# Patient Record
Sex: MALE
[De-identification: names, ages, dates, MRNs, and addresses within clinical notes are randomized per-mention and may not be internally consistent; named-entity substitution may affect disease eponyms.]

---

## 2023-12-15 ENCOUNTER — P2P PATIENT RECORD (OUTPATIENT)
Age: 78
End: 2023-12-15

## 2024-01-08 ENCOUNTER — OFFICE VISIT (OUTPATIENT)
Dept: URBAN - METROPOLITAN AREA CLINIC 19 | Facility: CLINIC | Age: 79
End: 2024-01-08
Payer: MEDICARE

## 2024-01-08 DIAGNOSIS — R63.4 WEIGHT LOSS: ICD-10-CM

## 2024-01-08 DIAGNOSIS — D50.8 OTHER IRON DEFICIENCY ANEMIA: ICD-10-CM

## 2024-01-08 DIAGNOSIS — R63.0 LOSS OF APPETITE: ICD-10-CM

## 2024-01-08 PROCEDURE — 99204 OFFICE O/P NEW MOD 45 MIN: CPT | Performed by: INTERNAL MEDICINE

## 2024-01-08 RX ORDER — APIXABAN 5 MG/1
1 TABLET TABLET, FILM COATED ORAL TWICE A DAY
Status: ACTIVE | COMMUNITY

## 2024-01-08 RX ORDER — TRAZODONE HYDROCHLORIDE 150 MG/1
1 TABLET AT BEDTIME TABLET ORAL ONCE A DAY
Status: ACTIVE | COMMUNITY

## 2024-01-08 RX ORDER — ROSUVASTATIN CALCIUM 20 MG/1
1 TABLET TABLET, COATED ORAL ONCE A DAY
Status: ACTIVE | COMMUNITY

## 2024-01-08 RX ORDER — DOXEPIN HYDROCHLORIDE 10 MG/1
1 CAPSULE AT BEDTIME CAPSULE ORAL ONCE A DAY
Status: ACTIVE | COMMUNITY

## 2024-01-08 RX ORDER — DUTASTERIDE 0.5 MG/1
1 CAPSULE CAPSULE, LIQUID FILLED ORAL ONCE A DAY
Status: ACTIVE | COMMUNITY

## 2024-01-08 RX ORDER — LORAZEPAM 1 MG/1
1 TABLET AT BEDTIME AS NEEDED TABLET ORAL ONCE A DAY
Status: ACTIVE | COMMUNITY

## 2024-01-08 RX ORDER — CYCLOBENZAPRINE HYDROCHLORIDE 10 MG/1
1 TABLET AT BEDTIME AS NEEDED TABLET, FILM COATED ORAL ONCE A DAY
Status: ACTIVE | COMMUNITY

## 2024-01-08 RX ORDER — DULOXETINE 60 MG/1
1 CAPSULE CAPSULE, DELAYED RELEASE PELLETS ORAL ONCE A DAY
Status: ACTIVE | COMMUNITY

## 2024-01-08 RX ORDER — HYDROCODONE BITARTRATE AND ACETAMINOPHEN 10; 325 MG/1; MG/1
1 TABLET AS NEEDED TABLET ORAL
Status: ACTIVE | COMMUNITY

## 2024-01-08 NOTE — HPI-TODAY'S VISIT:
Mr. Everett is a 78 yr old man referred by Dr. Andrés Chaudhari for anemia and weight loss. His mst recent Hb was 11 , iron sat 13%, ferritin 38. Also has lost almost 40 pounds in the past few weeks. Very poor appetite. On Eliquis for atrial fibrillation. Denies any blood in stool.

## 2024-01-08 NOTE — PHYSICAL EXAM CONSTITUTIONAL:
Thin, chronically ill appearing elderly man , in no acute distress , ambulating without difficulty , normal communication ability

## 2024-01-14 ENCOUNTER — DASHBOARD ENCOUNTERS (OUTPATIENT)
Age: 79
End: 2024-01-14

## 2024-01-14 ENCOUNTER — LAB OUTSIDE AN ENCOUNTER (OUTPATIENT)
Dept: URBAN - METROPOLITAN AREA CLINIC 19 | Facility: CLINIC | Age: 79
End: 2024-01-14

## 2024-01-14 PROBLEM — 87522002: Status: ACTIVE | Noted: 2024-01-14

## 2024-01-14 PROBLEM — 79890006: Status: ACTIVE | Noted: 2024-01-14

## 2024-03-07 ENCOUNTER — LAB (OUTPATIENT)
Dept: URBAN - METROPOLITAN AREA CLINIC 4 | Facility: CLINIC | Age: 79
End: 2024-03-07
Payer: MEDICARE

## 2024-03-07 ENCOUNTER — COL/EGD (OUTPATIENT)
Dept: URBAN - METROPOLITAN AREA SURGERY CENTER 31 | Facility: SURGERY CENTER | Age: 79
End: 2024-03-07
Payer: MEDICARE

## 2024-03-07 DIAGNOSIS — D50.9 ANEMIA: ICD-10-CM

## 2024-03-07 DIAGNOSIS — D12.4 ADENOMA OF DESCENDING COLON: ICD-10-CM

## 2024-03-07 DIAGNOSIS — D12.3 ADENOMA OF TRANSVERSE COLON: ICD-10-CM

## 2024-03-07 DIAGNOSIS — K31.89 OTHER DISEASES OF STOMACH AND DUODENUM: ICD-10-CM

## 2024-03-07 DIAGNOSIS — K31.89 ACHYLIA: ICD-10-CM

## 2024-03-07 PROBLEM — 724556004: Status: ACTIVE | Noted: 2024-03-07

## 2024-03-07 PROCEDURE — 45385 COLONOSCOPY W/LESION REMOVAL: CPT | Performed by: INTERNAL MEDICINE

## 2024-03-07 PROCEDURE — G8907 PT DOC NO EVENTS ON DISCHARG: HCPCS | Performed by: INTERNAL MEDICINE

## 2024-03-07 PROCEDURE — 88305 TISSUE EXAM BY PATHOLOGIST: CPT | Performed by: PATHOLOGY

## 2024-03-07 PROCEDURE — 43239 EGD BIOPSY SINGLE/MULTIPLE: CPT | Performed by: INTERNAL MEDICINE

## 2024-03-07 PROCEDURE — 88312 SPECIAL STAINS GROUP 1: CPT | Performed by: PATHOLOGY

## 2024-03-07 RX ORDER — ROSUVASTATIN CALCIUM 20 MG/1
1 TABLET TABLET, COATED ORAL ONCE A DAY
Status: ACTIVE | COMMUNITY

## 2024-03-07 RX ORDER — APIXABAN 5 MG/1
1 TABLET TABLET, FILM COATED ORAL TWICE A DAY
Status: ACTIVE | COMMUNITY

## 2024-03-07 RX ORDER — TRAZODONE HYDROCHLORIDE 150 MG/1
1 TABLET AT BEDTIME TABLET ORAL ONCE A DAY
Status: ACTIVE | COMMUNITY

## 2024-03-07 RX ORDER — DUTASTERIDE 0.5 MG/1
1 CAPSULE CAPSULE, LIQUID FILLED ORAL ONCE A DAY
Status: ACTIVE | COMMUNITY

## 2024-03-07 RX ORDER — CYCLOBENZAPRINE HYDROCHLORIDE 10 MG/1
1 TABLET AT BEDTIME AS NEEDED TABLET, FILM COATED ORAL ONCE A DAY
Status: ACTIVE | COMMUNITY

## 2024-03-07 RX ORDER — DULOXETINE 60 MG/1
1 CAPSULE CAPSULE, DELAYED RELEASE PELLETS ORAL ONCE A DAY
Status: ACTIVE | COMMUNITY

## 2024-03-07 RX ORDER — HYDROCODONE BITARTRATE AND ACETAMINOPHEN 10; 325 MG/1; MG/1
1 TABLET AS NEEDED TABLET ORAL
Status: ACTIVE | COMMUNITY

## 2024-03-07 RX ORDER — DOXEPIN HYDROCHLORIDE 10 MG/1
1 CAPSULE AT BEDTIME CAPSULE ORAL ONCE A DAY
Status: ACTIVE | COMMUNITY

## 2024-03-07 RX ORDER — LORAZEPAM 1 MG/1
1 TABLET AT BEDTIME AS NEEDED TABLET ORAL ONCE A DAY
Status: ACTIVE | COMMUNITY

## 2024-03-27 ENCOUNTER — PILLCAM (OUTPATIENT)
Dept: URBAN - METROPOLITAN AREA CLINIC 18 | Facility: CLINIC | Age: 79
End: 2024-03-27

## 2024-03-27 RX ORDER — ROSUVASTATIN CALCIUM 20 MG/1
1 TABLET TABLET, COATED ORAL ONCE A DAY
Status: ACTIVE | COMMUNITY

## 2024-03-27 RX ORDER — APIXABAN 5 MG/1
1 TABLET TABLET, FILM COATED ORAL TWICE A DAY
Status: ACTIVE | COMMUNITY

## 2024-03-27 RX ORDER — LORAZEPAM 1 MG/1
1 TABLET AT BEDTIME AS NEEDED TABLET ORAL ONCE A DAY
Status: ACTIVE | COMMUNITY

## 2024-03-27 RX ORDER — DOXEPIN HYDROCHLORIDE 10 MG/1
1 CAPSULE AT BEDTIME CAPSULE ORAL ONCE A DAY
Status: ACTIVE | COMMUNITY

## 2024-03-27 RX ORDER — DUTASTERIDE 0.5 MG/1
1 CAPSULE CAPSULE, LIQUID FILLED ORAL ONCE A DAY
Status: ACTIVE | COMMUNITY

## 2024-03-27 RX ORDER — TRAZODONE HYDROCHLORIDE 150 MG/1
1 TABLET AT BEDTIME TABLET ORAL ONCE A DAY
Status: ACTIVE | COMMUNITY

## 2024-03-27 RX ORDER — DULOXETINE 60 MG/1
1 CAPSULE CAPSULE, DELAYED RELEASE PELLETS ORAL ONCE A DAY
Status: ACTIVE | COMMUNITY

## 2024-03-27 RX ORDER — CYCLOBENZAPRINE HYDROCHLORIDE 10 MG/1
1 TABLET AT BEDTIME AS NEEDED TABLET, FILM COATED ORAL ONCE A DAY
Status: ACTIVE | COMMUNITY

## 2024-03-27 RX ORDER — HYDROCODONE BITARTRATE AND ACETAMINOPHEN 10; 325 MG/1; MG/1
1 TABLET AS NEEDED TABLET ORAL
Status: ACTIVE | COMMUNITY

## 2024-12-12 ENCOUNTER — CLAIMS CREATED FROM THE CLAIM WINDOW (OUTPATIENT)
Dept: URBAN - METROPOLITAN AREA MEDICAL CENTER 18 | Facility: MEDICAL CENTER | Age: 79
End: 2024-12-12
Payer: MEDICARE

## 2024-12-12 DIAGNOSIS — R93.3 ABN FINDINGS-GI TRACT: ICD-10-CM

## 2024-12-12 DIAGNOSIS — R63.4 ABNORMAL INTENTIONAL WEIGHT LOSS: ICD-10-CM

## 2024-12-12 DIAGNOSIS — R10.84 ABDOMINAL CRAMPING, GENERALIZED: ICD-10-CM

## 2024-12-12 PROCEDURE — 99222 1ST HOSP IP/OBS MODERATE 55: CPT

## 2024-12-12 PROCEDURE — G8427 DOCREV CUR MEDS BY ELIG CLIN: HCPCS

## 2024-12-12 PROCEDURE — 99254 IP/OBS CNSLTJ NEW/EST MOD 60: CPT

## 2024-12-13 ENCOUNTER — CLAIMS CREATED FROM THE CLAIM WINDOW (OUTPATIENT)
Dept: URBAN - METROPOLITAN AREA MEDICAL CENTER 18 | Facility: MEDICAL CENTER | Age: 79
End: 2024-12-13
Payer: MEDICARE

## 2024-12-13 DIAGNOSIS — R10.84 ABDOMINAL CRAMPING, GENERALIZED: ICD-10-CM

## 2024-12-13 DIAGNOSIS — R93.3 ABN FINDINGS-GI TRACT: ICD-10-CM

## 2024-12-13 PROCEDURE — 99232 SBSQ HOSP IP/OBS MODERATE 35: CPT

## 2024-12-16 ENCOUNTER — CLAIMS CREATED FROM THE CLAIM WINDOW (OUTPATIENT)
Dept: URBAN - METROPOLITAN AREA MEDICAL CENTER 18 | Facility: MEDICAL CENTER | Age: 79
End: 2024-12-16
Payer: MEDICARE

## 2024-12-16 DIAGNOSIS — R10.84 ABDOMINAL CRAMPING, GENERALIZED: ICD-10-CM

## 2024-12-16 DIAGNOSIS — R93.3 ABN FINDINGS-GI TRACT: ICD-10-CM

## 2024-12-16 PROCEDURE — 99232 SBSQ HOSP IP/OBS MODERATE 35: CPT

## 2024-12-17 ENCOUNTER — CLAIMS CREATED FROM THE CLAIM WINDOW (OUTPATIENT)
Dept: URBAN - METROPOLITAN AREA MEDICAL CENTER 18 | Facility: MEDICAL CENTER | Age: 79
End: 2024-12-17
Payer: MEDICARE

## 2024-12-17 DIAGNOSIS — R10.84 ABDOMINAL CRAMPING, GENERALIZED: ICD-10-CM

## 2024-12-17 DIAGNOSIS — R93.3 ABN FINDINGS-GI TRACT: ICD-10-CM

## 2024-12-17 PROCEDURE — 99232 SBSQ HOSP IP/OBS MODERATE 35: CPT

## 2024-12-20 ENCOUNTER — CLAIMS CREATED FROM THE CLAIM WINDOW (OUTPATIENT)
Dept: URBAN - METROPOLITAN AREA MEDICAL CENTER 18 | Facility: MEDICAL CENTER | Age: 79
End: 2024-12-20
Payer: MEDICARE

## 2024-12-20 DIAGNOSIS — R10.84 ABDOMINAL CRAMPING, GENERALIZED: ICD-10-CM

## 2024-12-20 DIAGNOSIS — R93.3 ABN FINDINGS-GI TRACT: ICD-10-CM

## 2024-12-20 PROCEDURE — 99232 SBSQ HOSP IP/OBS MODERATE 35: CPT

## 2024-12-21 ENCOUNTER — CLAIMS CREATED FROM THE CLAIM WINDOW (OUTPATIENT)
Dept: URBAN - METROPOLITAN AREA MEDICAL CENTER 18 | Facility: MEDICAL CENTER | Age: 79
End: 2024-12-21
Payer: MEDICARE

## 2024-12-21 DIAGNOSIS — R10.84 ABDOMINAL CRAMPING, GENERALIZED: ICD-10-CM

## 2024-12-21 PROCEDURE — 99232 SBSQ HOSP IP/OBS MODERATE 35: CPT | Performed by: INTERNAL MEDICINE
